# Patient Record
(demographics unavailable — no encounter records)

---

## 2024-12-20 NOTE — HISTORY OF PRESENT ILLNESS
[FreeTextEntry1] : 52-year-old male with no significant past medical history former , no family history of early CAD/stroke/sudden death, no toxic habits, active with no symptoms of chest pain/shortness of breath/dyspnea on exertion/syncope/palpitations.  Patient has rescued a dog recently.  Has had severe allergies to it.  With shortness of breath tightening in the throat and feeling of palpitations and went to the hospital.  Patient was hypertensive at the hospital with no significant arrhythmias.  And he was diagnosed with coronavirus, and likely a combination of the cold virus/allergies.  Patient had no chest pain.  It was only a throat discomfort and coughing.  No syncope.  EKG with bifascicular block.

## 2024-12-20 NOTE — DISCUSSION/SUMMARY
[EKG obtained to assist in diagnosis and management of assessed problem(s)] : EKG obtained to assist in diagnosis and management of assessed problem(s) [FreeTextEntry1] : Throat tightness/ palpitations  This is likely due to the allergic reaction/the coronavirus. Will do a stress echo with impression of atypical chest pain TTE Lipid panel Will follow the patient in 2 to 3 weeks Holter

## 2025-03-13 NOTE — DISCUSSION/SUMMARY
[FreeTextEntry1] : Palpitations. Atypical chest pain. There is no chest tightness/shortness of breath anymore. Will do a Holter monitor for palpitations. TTE.  Stress echo did show some wall motion abnormalities which could be due to conduction abnormalities. CCTA with calcium score of 0 and normal coronaries. LDL of 90s on statin.    [EKG obtained to assist in diagnosis and management of assessed problem(s)] : EKG obtained to assist in diagnosis and management of assessed problem(s)

## 2025-03-13 NOTE — HISTORY OF PRESENT ILLNESS
[FreeTextEntry1] : 52-year-old male with no significant past medical history former , no family history of early CAD/stroke/sudden death, no toxic habits, active with no symptoms of chest pain/shortness of breath/dyspnea on exertion/syncope/palpitations.  Patient has rescued a dog recently.  Has had severe allergies to it.  With shortness of breath tightening in the throat and feeling of palpitations and went to the hospital.  Patient was hypertensive at the hospital with no significant arrhythmias.  And he was diagnosed with coronavirus, and likely a combination of the cold virus/allergies.  Patient had no chest pain.  It was only a throat discomfort and coughing.  No syncope.  EKG with bifascicular block.  3/13/2025 patient is feeling fine.  No chest pain/shortness of breath.  Patient still has palpitations.  No syncope.  EKG unchanged.  CCTA with calcium score of 0 and normal coronaries.  TTE and Holter monitor pending.